# Patient Record
(demographics unavailable — no encounter records)

---

## 2024-10-14 NOTE — ASSESSMENT
[FreeTextEntry1] : REASON FOR REQUEST: This young woman comes today unaccompanied.  Today, my physician assistant, Tamie More PA-C, acted as a chaperone for the encounter.  Today's visit lasted for approximately 45 minutes with time spent discussing surgical management regarding the diagnosis of anterosuperior labral tearing as well as severe acetabular dysplasia, left greater than right.  Today's visit did not include time for teaching or separately reported services.   HISTORY OF PRESENT ILLNESS:  Josephine is approximately a 22-year-old female who had been treated in the past regarding the diagnosis of developmental dysplasia of the hip.  Per report, there was full normalization of the hips bilaterally.  Josephine as a child was treated with bracing formally.  Her mother had reported that there were no further concerns and no further followup have been scheduled.  Josephine has been having complaints of hip pain for which this prompted a visit to Dr. Amaro who had performed x-ray imaging indicating the presence of acetabular dysplasia with lateral center-edge angles, which appear to be less than 20 degrees.  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  The patient has failed conservative management.  Physical therapy has not yielded an acceptable result.  The patient has complaints of groin pain.  She reports that with work, she has to stand for extended periods of time, but at this point has not made any significant improvements and is concerned that the condition affecting her hips will lead to degeneration ultimately requiring total hip arthroplasty.  Josephine comes today for further discussions to review possible concomitant procedure with Dr. Amaro to address her dysplasia and labral pathology.   PAST MEDICAL HISTORY:  As above.   PAST SURGICAL HISTORY: None.   ALLERGIES: The patient has no known drug allergies.   MEDICATIONS: The patient does not take any medications though the patient does have an IUD, which appears to be a hormone-based IUD.  She does not take oral contraceptive medications although.   REVIEW OF SYSTEMS: Today is negative for fever, chills, chest pain, shortness of breath, or rashes.   FAMILY/SOCIAL HISTORY:  The child has one sibling who is healthy.  There is a history of hip dysplasia in the family.  The patient indicates that she does not smoke tobacco but smokes marijuana.  She does not reside within a tobacco household.   PHYSICAL EXAMINATION: On physical examination today, Josephine is in no apparent distress.  She is pleasant, cooperative and alert, appropriate for age.  She ambulates with no evidence of antalgia.  She has somewhat of a waddle to her gait, but this appears to be negative for Trendelenburg sign bilaterally.  Her hip examination demonstrates pain with anterior impingement bilaterally.  Internal rotation is to about 30 degrees bilaterally.  The patient has negative anterior apprehension and posterior impingement bilaterally.  She has sensation grossly intact to light touch.  She has 5/5 abductor strength tested in the lateral decubitus position with negative straight leg raise and negative ABRAHAM testing today.   REVIEW OF IMAGING: X-ray imaging was available for review, AP as well as frog leg lateral view of the right hip indicating cam morphology.  The patient has an acetabular index approaching 14 degrees on the right and even less on the left.  There is no break in Shenton's line.  There is a congruent relationship between the acetabulum and the femoral head.  Tonnis grade 1 changes are noted based on dysmorphic features of the femoral head.  Other than this, there is no evidence of sclerosis or joint space narrowing.  There is no evidence of proximal migration of the femoral head with no evidence of break in Shenton's line.  MRI imaging was available for review indicating anterosuperior labral tearing as well as cam morphology.   ASSESSMENT/PLAN:  Josephine is a 22-year-old female who has a diagnosis of bilateral developmental dysplasia of the hip, left greater than right, although her symptoms primarily involve her right hip.  She has been evaluated by Dr. Amaro who had re-indicated her for surgical treatment.  She must have her labral tear as well as cam morphology addressed, although Dr. Amaro had hesitations given the fact that this could cause further instability and degeneration of the hip if the dysplasia is not addressed.  Today, I reviewed the procedure known as the periacetabular osteotomy RADHA, which involves cutting the pelvis and creating a pelvic discontinuity in order to improve coverage over the femoral head to minimize pressure to allow the labrum to heal and to hopefully prevent any further degenerative changes of the hip.  I have reviewed the fact that Josephine is in a good prognostic group but she does not have significant degeneration of the hip joint and appears to be Tonnis grade 1, which is favorable.  She does not have any other warning signs that would indicate that there may be an early failure of this treatment.  I discussed the postoperative protocol with weightbearing restrictions as well as total recovery time of anywhere between six months and one year and the need to stage surgical treatment.  I also reviewed the possibility of DVT, lateral femoral cutaneous nerve irritation and permanent numbness as well as injury to the neurovascular structure surrounding the hip.  I discussed the fact that even with this procedure there is a chance of failure and that this would not necessarily preserve the cartilage with need for an early total hip arthroplasty, although I feel that this would represent the best chance she has for hip preservation.  I will be in contact with Dr. Amaro as Josephine has indicated that she would like to proceed with surgical management so that we can hopefully perform both at the same time with Dr. Amaro performing his arthroscopic procedure first followed by RAHDA to follow.  Potential for staging this by about three to four weeks was also discussed.  I reviewed with the patient that I would like her to be evaluated by her obstetrician gynecologist for discussion of removal of the IUD with oral contraceptives as this may raise her risk of having a deep venous thrombosis.  After consultation with her obstetrician gynecologist we will have further discussions.  Once we pick a definitive date for the surgical management to be coordinated with Dr. Amaro's office, we will bring Josephine back for a more full discussion of the exact details of the operation.  All questions were answered to satisfaction today.  Josephine expressed understanding and agrees.

## 2024-10-14 NOTE — ASSESSMENT
[FreeTextEntry1] : REASON FOR REQUEST: This young woman comes today unaccompanied.  Today, my physician assistant, Tamie More PA-C, acted as a chaperone for the encounter.  Today's visit lasted for approximately 45 minutes with time spent discussing surgical management regarding the diagnosis of anterosuperior labral tearing as well as severe acetabular dysplasia, left greater than right.  Today's visit did not include time for teaching or separately reported services.   HISTORY OF PRESENT ILLNESS:  Josephine is approximately a 22-year-old female who had been treated in the past regarding the diagnosis of developmental dysplasia of the hip.  Per report, there was full normalization of the hips bilaterally.  Josephine as a child was treated with bracing formally.  Her mother had reported that there were no further concerns and no further followup have been scheduled.  Josephine has been having complaints of hip pain for which this prompted a visit to Dr. Amaro who had performed x-ray imaging indicating the presence of acetabular dysplasia with lateral center-edge angles, which appear to be less than 20 degrees.  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  The patient has failed conservative management.  Physical therapy has not yielded an acceptable result.  The patient has complaints of groin pain.  She reports that with work, she has to stand for extended periods of time, but at this point has not made any significant improvements and is concerned that the condition affecting her hips will lead to degeneration ultimately requiring total hip arthroplasty.  Josephine comes today for further discussions to review possible concomitant procedure with Dr. Amaro to address her dysplasia and labral pathology.   PAST MEDICAL HISTORY:  As above.   PAST SURGICAL HISTORY: None.   ALLERGIES: The patient has no known drug allergies.   MEDICATIONS: The patient does not take any medications though the patient does have an IUD, which appears to be a hormone-based IUD.  She does not take oral contraceptive medications although.   REVIEW OF SYSTEMS: Today is negative for fever, chills, chest pain, shortness of breath, or rashes.   FAMILY/SOCIAL HISTORY:  The child has one sibling who is healthy.  There is a history of hip dysplasia in the family.  The patient indicates that she does not smoke tobacco but smokes marijuana.  She does not reside within a tobacco household.   PHYSICAL EXAMINATION: On physical examination today, Josephine is in no apparent distress.  She is pleasant, cooperative and alert, appropriate for age.  She ambulates with no evidence of antalgia.  She has somewhat of a waddle to her gait, but this appears to be negative for Trendelenburg sign bilaterally.  Her hip examination demonstrates pain with anterior impingement bilaterally.  Internal rotation is to about 30 degrees bilaterally.  The patient has negative anterior apprehension and posterior impingement bilaterally.  She has sensation grossly intact to light touch.  She has 5/5 abductor strength tested in the lateral decubitus position with negative straight leg raise and negative ABRAHAM testing today.   REVIEW OF IMAGING: X-ray imaging was available for review, AP as well as frog leg lateral view of the right hip indicating cam morphology.  The patient has an acetabular index approaching 14 degrees on the right and even less on the left.  There is no break in Shenton's line.  There is a congruent relationship between the acetabulum and the femoral head.  Tonnis grade 1 changes are noted based on dysmorphic features of the femoral head.  Other than this, there is no evidence of sclerosis or joint space narrowing.  There is no evidence of proximal migration of the femoral head with no evidence of break in Shenton's line.  MRI imaging was available for review indicating anterosuperior labral tearing as well as cam morphology.   ASSESSMENT/PLAN:  Josephine is a 22-year-old female who has a diagnosis of bilateral developmental dysplasia of the hip, left greater than right, although her symptoms primarily involve her right hip.  She has been evaluated by Dr. Amaro who had re-indicated her for surgical treatment.  She must have her labral tear as well as cam morphology addressed, although Dr. Amaro had hesitations given the fact that this could cause further instability and degeneration of the hip if the dysplasia is not addressed.  Today, I reviewed the procedure known as the periacetabular osteotomy RADHA, which involves cutting the pelvis and creating a pelvic discontinuity in order to improve coverage over the femoral head to minimize pressure to allow the labrum to heal and to hopefully prevent any further degenerative changes of the hip.  I have reviewed the fact that Josephine is in a good prognostic group but she does not have significant degeneration of the hip joint and appears to be Tonnis grade 1, which is favorable.  She does not have any other warning signs that would indicate that there may be an early failure of this treatment.  I discussed the postoperative protocol with weightbearing restrictions as well as total recovery time of anywhere between six months and one year and the need to stage surgical treatment.  I also reviewed the possibility of DVT, lateral femoral cutaneous nerve irritation and permanent numbness as well as injury to the neurovascular structure surrounding the hip.  I discussed the fact that even with this procedure there is a chance of failure and that this would not necessarily preserve the cartilage with need for an early total hip arthroplasty, although I feel that this would represent the best chance she has for hip preservation.  I will be in contact with Dr. Amaro as Josephine has indicated that she would like to proceed with surgical management so that we can hopefully perform both at the same time with Dr. Amaro performing his arthroscopic procedure first followed by RADHA to follow.  Potential for staging this by about three to four weeks was also discussed.  I reviewed with the patient that I would like her to be evaluated by her obstetrician gynecologist for discussion of removal of the IUD with oral contraceptives as this may raise her risk of having a deep venous thrombosis.  After consultation with her obstetrician gynecologist we will have further discussions.  Once we pick a definitive date for the surgical management to be coordinated with Dr. Amaro's office, we will bring Josephine back for a more full discussion of the exact details of the operation.  All questions were answered to satisfaction today.  Josephine expressed understanding and agrees.